# Patient Record
Sex: FEMALE
[De-identification: names, ages, dates, MRNs, and addresses within clinical notes are randomized per-mention and may not be internally consistent; named-entity substitution may affect disease eponyms.]

---

## 2018-08-20 ENCOUNTER — HOSPITAL ENCOUNTER (EMERGENCY)
Dept: HOSPITAL 89 - ER | Age: 38
Discharge: HOME | End: 2018-08-20
Payer: COMMERCIAL

## 2018-08-20 ENCOUNTER — HOSPITAL ENCOUNTER (OUTPATIENT)
Dept: HOSPITAL 89 - AMB | Age: 38
End: 2018-08-20
Payer: COMMERCIAL

## 2018-08-20 VITALS — DIASTOLIC BLOOD PRESSURE: 85 MMHG | SYSTOLIC BLOOD PRESSURE: 113 MMHG

## 2018-08-20 DIAGNOSIS — T78.40XA: ICD-10-CM

## 2018-08-20 DIAGNOSIS — R06.82: ICD-10-CM

## 2018-08-20 DIAGNOSIS — M79.89: ICD-10-CM

## 2018-08-20 DIAGNOSIS — R07.89: Primary | ICD-10-CM

## 2018-08-20 DIAGNOSIS — I95.9: ICD-10-CM

## 2018-08-20 DIAGNOSIS — T78.40XA: Primary | ICD-10-CM

## 2018-08-20 PROCEDURE — 96365 THER/PROPH/DIAG IV INF INIT: CPT

## 2018-08-20 PROCEDURE — 96375 TX/PRO/DX INJ NEW DRUG ADDON: CPT

## 2018-08-20 PROCEDURE — 99283 EMERGENCY DEPT VISIT LOW MDM: CPT

## 2018-08-20 NOTE — ER REPORT
History and Physical


Time Seen By MD:  11:24


HPI/ROS


CHIEF COMPLAINT: Drug reaction





HISTORY OF PRESENT ILLNESS: Patient had an allergic reaction after eating 

sunflower seeds with her breakfast this morning. She states no prior allergy 

that she is aware of. She states she began feeling some swelling and itching to 

her eyes soon after eating breakfast but then soon after while exercising 

developed throat swelling difficulty breathing and wheezing. EMS was called she 

had taken a total of 75 mg of Benadryl orally prior to EMS arrival. She was 

given 0.3 mL of epinephrine's intramuscularly by EMS with rapid improvement in 

her symptoms. Patient had allergies to foods in the past but has not had a 

reaction in over 20 years.





REVIEW OF SYSTEMS:


Respiratory: Dyspnea


Cardiovascular: No chest pain, no palpitations.


Gastrointestinal: No vomiting, no abdominal pain.


Musculoskeletal: No back pain.


Allergies:  


Coded Allergies:  


     codeine (Verified  Allergy, Unknown, 8/20/18)


Home Meds


Active Scripts


Epinephrine (EPINEPHRINE) 0.3 Mg/0.3 Ml Pen.injctr, 0.3 MG IM ONCE for severe 

allergic reaction, #1 PACK 1 Refill


   Prov:BERRY MURRAY MD         8/20/18


Constitutional





Vital Sign - Last 24 Hours








 8/20/18 8/20/18 8/20/18 8/20/18





 11:21 11:30 11:35 11:50


 


Temp 94.7   


 


Pulse 92  85 83


 


Resp 22  37 24


 


B/P (MAP) 113/74 113/61 (78)  


 


Pulse Ox 100  84 97


 


O2 Delivery Room Air   














Intake and Output   


 


 8/20/18 8/20/18 8/21/18





 15:00 23:00 07:00


 


Intake Total 50 ml  


 


Balance 50 ml  








Physical Exam


General/Constitutional: Patient is awake, alert, nontoxic and in no acute 

respiratory distress. 


Head: Normocephalic and atraumatic


Oropharyngeal: Mucous membranes are moist. There is no pharyngeal erythema or 

exudate. There are no palatal petechiae. Uvula is midline and symmetrical. 


Neck: Supple, no adenopathy.


Cardiovascular: Heart is regular rate and rhythm without audible murmurs, rubs 

or gallops. 


Pulmonary: Lungs are clear to auscultation bilaterally. There are no wheezes, 

rales, or rhonchi. Chest rise is symmetrical


Extremities: No gross deformities, No peripheral cyanosis. Able to move all 4 

extremities.


Neuro: Alert and oriented X3, 


Skin: No rashes, skin is warm dry and well perfused.





Medical Decision Making


ED Course/Re-evaluation


ED Course


 08/20/2018 11:45:55 am suspected allergic reaction to sunflower seeds. Patient 

took a total of 75 mg of oral Benadryl prehospital and received 0.3 mL's of 1-

1000 epinephrine intramuscularly by EMS with rapid improvement of her symptoms. 

Upon arrival to the emergency department respiratory symptoms have nearly 

abated. Plan at this time will be IV Pepcid along with IV Solu-Medrol and a 

period of observation in the emergency department.


Re-evaluation


 08/20/2018 12:16:57 pm patient remained symptom-free at this time will 

discharge home.


Decision to Disposition Date:  Aug 20, 2018


Decision to Disposition Time:  12:36





Depart


Departure


Latest Vital Signs





Vital Signs








  Date Time  Temp Pulse Resp B/P (MAP) Pulse Ox O2 Delivery O2 Flow Rate FiO2


 


8/20/18 11:50  83 24  97   


 


8/20/18 11:30    113/61 (78)    


 


8/20/18 11:21 94.7     Room Air  








Impression:  


 Primary Impression:  


 Allergic reaction


Condition:  Improved


Disposition:  HOME OR SELF-CARE


New Scripts


Epinephrine (EPINEPHRINE) 0.3 Mg/0.3 Ml Pen.injctr


0.3 MG IM ONCE for severe allergic reaction, #1 PACK 1 Refill


   Prov: BERRY MURRAY MD         8/20/18


Patient Instructions:  General Allergic Reaction (ED)





Additional Instructions:  


Take Benadryl 25 mg every 6 hours for the next 24 hours. Avoid sunflower seeds 

from now on. Peak appear prescription for epinephrine auto injector and use as 

directed





Problem Qualifiers








 Primary Impression:  


 Allergic reaction


 Encounter type:  initial encounter  Qualified Codes:  T78.40XA - Allergy, 

unspecified, initial encounter








BERRY MURRAY MD Aug 20, 2018 11:24